# Patient Record
Sex: MALE | Race: WHITE | URBAN - METROPOLITAN AREA
[De-identification: names, ages, dates, MRNs, and addresses within clinical notes are randomized per-mention and may not be internally consistent; named-entity substitution may affect disease eponyms.]

---

## 2017-05-31 ENCOUNTER — APPOINTMENT (OUTPATIENT)
Dept: GENERAL RADIOLOGY | Facility: CLINIC | Age: 27
End: 2017-05-31
Attending: EMERGENCY MEDICINE
Payer: COMMERCIAL

## 2017-05-31 ENCOUNTER — HOSPITAL ENCOUNTER (EMERGENCY)
Facility: CLINIC | Age: 27
Discharge: HOME | End: 2017-05-31
Attending: EMERGENCY MEDICINE | Admitting: EMERGENCY MEDICINE
Payer: COMMERCIAL

## 2017-05-31 VITALS
BODY MASS INDEX: 24.52 KG/M2 | OXYGEN SATURATION: 99 % | TEMPERATURE: 97.7 F | WEIGHT: 161.8 LBS | RESPIRATION RATE: 16 BRPM | HEART RATE: 65 BPM | DIASTOLIC BLOOD PRESSURE: 70 MMHG | SYSTOLIC BLOOD PRESSURE: 120 MMHG | HEIGHT: 68 IN
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
## 2017-05-31 DIAGNOSIS — S43.004A SHOULDER DISLOCATION, RIGHT, INITIAL ENCOUNTER: ICD-10-CM

## 2017-05-31 PROCEDURE — 99283 EMERGENCY DEPT VISIT LOW MDM: CPT | Performed by: EMERGENCY MEDICINE

## 2017-05-31 PROCEDURE — 99284 EMERGENCY DEPT VISIT MOD MDM: CPT | Mod: Z6 | Performed by: EMERGENCY MEDICINE

## 2017-05-31 PROCEDURE — 73030 X-RAY EXAM OF SHOULDER: CPT | Mod: RT

## 2017-05-31 NOTE — ED AVS SNAPSHOT
[facility], [place], Emergency Department    [address] [place] [address]    Phone:  [phone]                                       Eduard Del Valle   MRN: [id number]    Department:  [facility], Emergency Department   Date of Visit:  5/31/2017           After Visit Summary Signature Page     I have received my discharge instructions, and my questions have been answered. I have discussed any challenges I see with this plan with the nurse or doctor.    ..........................................................................................................................................  Patient/Patient Representative Signature      ..........................................................................................................................................  Patient Representative Print Name and Relationship to Patient    ..................................................               ................................................  Date                                            Time    ..........................................................................................................................................  Reviewed by Signature/Title    ...................................................              ..............................................  Date                                                            Time

## 2017-05-31 NOTE — ED AVS SNAPSHOT
[facility], Emergency Department    [address] [facility] [address]    Phone:  [phone]                                       Eduard Del Valle   MRN: [id number]    Department:  [facility], Emergency Department   Date of Visit:  5/31/2017           Patient Information     Date Of Birth          1990        Your diagnoses for this visit were:     Shoulder dislocation, right, initial encounter        You were seen by Serenity Reece MD.        Discharge Instructions       Thank you for coming to the [facility] Emergency Department.     It will take ~4-6 weeks for your shoulder to heal.   Wear the sling for the next 2 weeks. Remove the sling twice daily to do gentle range of motion activities of the shoulder. You may also remove the sling to shower and sleep.     Use ibuprofen 800mg every 8 hours or tylenol 1000mg every 6 hours as needed for pain.     Please follow up in the [facility] or the Orthopedic Surgery clinic in the next 1-2 weeks. You are likely to benefit from physical therapy to help you return to normal use of the shoulder after injury.           24 Hour Appointment Hotline       To make an appointment at any [facility], call [phone] ([phone]). If you don't have a family doctor or clinic, we will help you find one. [facility] are conveniently located to serve the needs of you and your family.          ED Discharge Orders     ARM SLING L                    Review of your medicines      Notice     You have not been prescribed any medications.            Procedures and tests performed during your visit     XR Shoulder Right 2 Views      Orders Needing Specimen Collection     None      Pending Results     Date and Time Order Name Status Description    5/31/2017 2012 XR Shoulder Right 2 Views In process             Pending Culture Results     No orders found from 5/29/2017 to 6/1/2017.            Pending Results Instructions      "If you had any lab results that were not finalized at the time of your Discharge, you can call the ED Lab Result RN at [phone]. You will be contacted by this team for any positive Lab results or changes in treatment. The nurses are available 7 days a week from 10A to 6:30P.  You can leave a message 24 hours per day and they will return your call.        Thank you for choosing [facility]       Thank you for choosing [facility] for your care. Our goal is always to provide you with excellent care. Hearing back from our patients is one way we can continue to improve our services. Please take a few minutes to complete the written survey that you may receive in the mail after you visit with us. Thank you!        [employer] Information     Clinical Data lets you send messages to your doctor, view your test results, renew your prescriptions, schedule appointments and more. To sign up, go to [email or web address] . Click on \"Log in\" on the left side of the screen, which will take you to the Welcome page. Then click on \"Sign up Now\" on the right side of the page.     You will be asked to enter the access code listed below, as well as some personal information. Please follow the directions to create your username and password.     Your access code is: [email or web address]  Expires: 2017  8:55 PM     Your access code will  in 90 days. If you need help or a new code, please call your [facility] clinic or [phone].        Care EveryWhere ID     This is your Care EveryWhere ID. This could be used by other organizations to access your [facility] medical records  [id number]        After Visit Summary       This is your record. Keep this with you and show to your community pharmacist(s) and doctor(s) at your next visit.                  "

## 2017-06-01 ASSESSMENT — ENCOUNTER SYMPTOMS
CHILLS: 0
NAUSEA: 0
FEVER: 0
VOMITING: 0
DIARRHEA: 0

## 2017-06-01 NOTE — DISCHARGE INSTRUCTIONS
Thank you for coming to the [facility] Emergency Department.     It will take ~4-6 weeks for your shoulder to heal.   Wear the sling for the next 2 weeks. Remove the sling twice daily to do gentle range of motion activities of the shoulder. You may also remove the sling to shower and sleep.     Use ibuprofen 800mg every 8 hours or tylenol 1000mg every 6 hours as needed for pain.     Please follow up in the [facility] or the Orthopedic Surgery clinic in the next 1-2 weeks. You are likely to benefit from physical therapy to help you return to normal use of the shoulder after injury.

## 2017-06-01 NOTE — ED NOTES
Pt was at the gym lifting weights and felt his shoulder pop out of socket. He states this happened before, same shoulder.

## 2017-06-01 NOTE — ED PROVIDER NOTES
"  History     Chief Complaint   Patient presents with     Shoulder Injury     HPI  Eduard Del Valle is a 26 year old male who presents to the ED with a right shoulder injury. Patient states 20 minutes ago he was at the gym performing a chest press when he noticed his right shoulder pop out of its socket. He states it feels back in place now after assistance by another person at the gym. He also reports this happened before in the same shoulder. No hx of previous shoulder surgery or fractures.  He currently denies sensation changes to right arm or hand. No current pain.    PAST MEDICAL HISTORY  History reviewed. No pertinent past medical history.     PAST SURGICAL HISTORY  Past Surgical History:   Procedure Laterality Date     ORTHOPEDIC SURGERY       FAMILY HISTORY  No family history on file.     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       MEDICATIONS  No current facility-administered medications for this encounter.      No current outpatient prescriptions on file.     ALLERGIES  No Known Allergies    I have reviewed the Medications, Allergies, Past Medical and Surgical History, and Social History in the Epic system.    Review of Systems   Constitutional: Negative for chills and fever.   Gastrointestinal: Negative for diarrhea, nausea and vomiting.   All other systems reviewed and are negative.      Physical Exam   BP: 120/70  Pulse: 66  Temp: 97.7  F (36.5  C)  Resp: 16  Height: 172.7 cm (5' 8\")  Weight: 73.4 kg (161 lb 12.8 oz)  SpO2: 97 %    Physical Exam   Gen:A&Ox3, no acute distress  HEENT:PERRL, no facial tenderness or wounds, head atraumatic  Neck:no bony tenderness  Back: no CVA tenderness, no midline bony tenderness  CV:RRR without murmurs  PULM:Clear to auscultation bilaterally  Abd:soft, nontender, nondistended. Bowel " sounds present and normal  UE:No traumatic injuries, skin normal, + radial pulses and normal distal perfusion. No clavicle tenderness. No shoulder deformity. Minimal pain with right shoulder ROM. No AC joint tenderness. Sensation and motor function to the right arm and hand intact. No ecchymosis or soft tissue swelling.   LE:no traumatic injuries, skin normal      ED Course     ED Course     Procedures   8:03 PM  The patient was seen and examined by Dr. Reece in Room 23.             Critical Care time:  none  Assessments & Plan (with Medical Decision Making)   27 yo M presenting with right shoulder dislocation while lifting weights. Relocated prior to arrival.   Xray done and negative for acute fractures or dislocation. Has Hill-Sachs deformity consistent with previous dislocation.  Vitals stable and affected arm neurovascularly intact.   Placed in sling for comfort.   Recommended gentle ROM activities to prevent frozen shoulder.  Ice, rest, NSAIDs or tylenol PRN for pain.   Follow up in [facility] or Ortho clinic in 1-2 weeks.       I have reviewed the nursing notes.    I have reviewed the findings, diagnosis, plan and need for follow up with the patient.    There are no discharge medications for this patient.      Final diagnoses:   Shoulder dislocation, right, initial encounter     IJeovany, am serving as a trained medical scribe to document services personally performed by Serenity Reece MD, based on the provider's statements to me.      ISerenity MD, was physically present and have reviewed and verified the accuracy of this note documented by Jeovany Freeman.     5/31/2017   [facility], EMERGENCY DEPARTMENT    MD ELIZABETH Weinberg Katrina Anne, MD  06/01/17 0142

## 2017-06-10 ENCOUNTER — THERAPY VISIT (OUTPATIENT)
Dept: PHYSICAL THERAPY | Facility: CLINIC | Age: 27
End: 2017-06-10
Payer: COMMERCIAL

## 2017-06-10 DIAGNOSIS — M25.311 SHOULDER INSTABILITY, RIGHT: Primary | ICD-10-CM

## 2017-06-10 PROCEDURE — 97110 THERAPEUTIC EXERCISES: CPT | Mod: GP | Performed by: PHYSICAL THERAPIST

## 2017-06-10 PROCEDURE — 97161 PT EVAL LOW COMPLEX 20 MIN: CPT | Mod: GP | Performed by: PHYSICAL THERAPIST

## 2017-06-10 PROCEDURE — 97530 THERAPEUTIC ACTIVITIES: CPT | Mod: GP | Performed by: PHYSICAL THERAPIST

## 2017-06-10 NOTE — MR AVS SNAPSHOT
After Visit Summary   6/10/2017    Eduard Del Valle    MRN: [id number]           Patient Information     Date Of Birth          1990        Visit Information        Provider Department      6/10/2017 10:00 AM Faith Rizo PT [facility]        Today's Diagnoses     Shoulder instability, right    -  1       Follow-ups after your visit        Your next 10 appointments already scheduled     Jun 14, 2017 10:20 AM CDT   NURY Extremity with Faith Rizo PT   [facility] ([facility])    [address] [place] [id number] [phone]            Jun 21, 2017 12:40 PM CDT   NURY Extremity with Paresh Soria PT   [facility] ( [facility])    [address] [place] [phone]            Jun 28, 2017  7:40 AM CDT   NURY Extremity with Faith Rizo PT   [facility] [place] ( [facility])    [address] [place] [phone]              Who to contact     If you have questions or need follow up information about today's clinic visit or your schedule please contact [facility] directly at [phone].  Normal or non-critical lab and imaging results will be communicated to you by MyChart, letter or phone within 4 business days after the clinic has received the results. If you do not hear from us within 7 days, please contact the clinic through MyChart or phone. If you have a critical or abnormal lab result, we will notify you by phone as soon as possible.  Submit refill requests through [employer] or call your pharmacy and they will forward the refill request to us. Please allow 3 business days for your refill to be completed.          Additional Information About Your Visit        MyChart Information      "[employer] lets you send messages to your doctor, view your test results, renew your prescriptions, schedule appointments and more. To sign up, go to [email or web address] . Click on \"Log in\" on the left side of the screen, which will take you to the Welcome page. Then click on \"Sign up Now\" on the right side of the page.     You will be asked to enter the access code listed below, as well as some personal information. Please follow the directions to create your username and password.     Your access code is: [email or web address]  Expires: 2017  8:55 PM     Your access code will  in 90 days. If you need help or a new code, please call your [facility] clinic or [phone].        Care EveryWhere ID     This is your Care EveryWhere ID. This could be used by other organizations to access your [facility] medical records  [id number]         Blood Pressure from Last 3 Encounters:   17 120/70    Weight from Last 3 Encounters:   17 73.4 kg (161 lb 12.8 oz)              We Performed the Following     HC PT EVAL, LOW COMPLEXITY     NURY INITIAL EVAL REPORT     THERAPEUTIC ACTIVITIES     THERAPEUTIC EXERCISES        Primary Care Provider Fax #    [facility] [phone]       [address] [place] [id number]        Thank you!     Thank you for choosing [facility] [place]  for your care. Our goal is always to provide you with excellent care. Hearing back from our patients is one way we can continue to improve our services. Please take a few minutes to complete the written survey that you may receive in the mail after your visit with us. Thank you!             Your Updated Medication List - Protect others around you: Learn how to safely use, store and throw away your medicines at www.disposemymeds.org.      Notice  As of 6/10/2017 11:59 PM    You have not been prescribed any medications.      "

## 2017-06-10 NOTE — PROGRESS NOTES
[facility]  Physical Exam  General   [facility]          Physical Therapy Initial Examination/Evaluation Estee 10, 2017   Eduard Del Valle is a 26 year old male referred to physical therapy for treatment following R shoulder dislocation  Therapist Assessment:   Clinical Impression: Pt presents to [facility] [place] with primary complaint of R shoulder instability.  Pt reports that he is no longer having pain but has limited the use of his R UE.  Per clinical examination, pt with decreased ROM on the R with pain noted at end range.  Pt hesitant to use R UE.  No pain reproduced during shoulder evaluation. Pt with R shoulder instability and generalized weakness.   Pt will benefit from skilled physical therapy for strengthening and proprioceptive training.   Subjective: Pt was lifting weights when R shoulder dislocated. He had not been to gym regularly and thinks he may have been lifting too much weight. Pt demonstrating that his shoulder was in the 90-90 position.  He did wear the sling initially but is no longer wearing this regularly.    DOI/onset: 2017   Mechanism of injury: Butterfly lift with shoulder in 90-90 position    DOS NA   Related PMH: Previous dislocation of R shoulder ~10 years ago, occasional back pain  Previous treatment: none   Imagin2017 x-ray   Chief Complaint: R shoulder dislocation  Pain: rest 0 /10 Progression of symptoms since initial onset: improving Time of day when pain is worse: none in particular but pt overall limiting use of shoulder   Sleeping: no issues, doesn't sleep on R side  Social history: lives with 2 roommates;  Originally from [place] and has been in the [place] for <1 year  Occupation: researcher; 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, computer work    Current HEP/exercise regimen: took a break from exercise for some time; normally swimming, running, wt lifting  Patient's goals are return to swimming, return to regular activities  Other  pertinent PMH: low blood pressure General health as reported by patient: Excellent   Return to MD: prn      SHOULDER EVALUATION    CERVICAL SCREEN WNL    STATIC POSTURE  Forward head: present    Rounded shoulders: present    Shoulder internally rotated: bilat        SHOULDER RANGE OF MOTION  AROM Flexion Abduction ER (neutral) Flex/ER Ext/IR   Left WNL WNL WNL WNL Thumb to T3   Right 134 132 hesitant 70 WNL Thumb to T8     PROM Flexion Abd 90-90 ER   Left WNL WNL WNL   Right 156 152 80       SHOULDER STRENGTH  MMT Flexion Scaption ER IR   Left 5/5 5/5 4/5 4+/5   Right 3+/5 3+/5 3/5 3+/5     SPECIAL TESTS Left Right   Sulcus sign Present but similar on both sides   Load and Shift Negative bilat   Impingement (5) Neg bilat   AC joint Negative bilat   Apprehension - +      PALPATION  Unable to reproduce pain       Assessment/Plan:  Patient is a 26 year old male with right side shoulder complaints.    Patient has the following significant findings with corresponding treatment plan.                Diagnosis 1:  R shoulder instability  Pain -  hot/cold therapy, manual therapy, education and home program  Decreased ROM/flexibility - manual therapy, therapeutic exercise and home program  Decreased strength - therapeutic exercise, therapeutic activities and home program  Impaired muscle performance - neuro re-education and home program  Decreased function - therapeutic activities and home program    Therapy Evaluation Codes:   1) History comprised of:   Personal factors that impact the plan of care:      Age, Past/current experiences and Time since onset of symptoms.    Comorbidity factors that impact the plan of care are:      Weakness.     Medications impacting care: None.  2) Examination of Body Systems comprised of:   Body structures and functions that impact the plan of care:      Shoulder.   Activity limitations that impact the plan of care are:      Bathing, Lifting and Sports.  3) Clinical presentation  characteristics are:   Stable/Uncomplicated.  4) Decision-Making    Low complexity using standardized patient assessment instrument and/or measureable assessment of functional outcome.  Cumulative Therapy Evaluation is: Low complexity.    Previous and current functional limitations:  (See Goal Flow Sheet for this information)    Short term and Long term goals: (See Goal Flow Sheet for this information)     Communication ability:  Patient appears to be able to clearly communicate and understand verbal and written communication and follow directions correctly.  Treatment Explanation - The following has been discussed with the patient:   RX ordered/plan of care  Anticipated outcomes  Possible risks and side effects  This patient would benefit from PT intervention to resume normal activities.   Rehab potential is good.    Frequency:  1 X week, once daily  Duration:  for 6 weeks  Discharge Plan:  Achieve all LTG.  Independent in home treatment program.  Reach maximal therapeutic benefit.    Please refer to the daily flowsheet for treatment today, total treatment time and time spent performing 1:1 timed codes.

## 2017-06-10 NOTE — LETTER
[facility]  [address], [place] [address]  [phone]    2017    Re: Eduard Del Valle   :   1990  MRN:  [id number]   REFERRING PHYSICIAN:   Serenity Reece    [facility]    Date of Initial Evaluation:  6/10/17  Visits:  Rxs Used: 1  Reason for Referral:  Shoulder instability, right    EVALUATION SUMMARY    HPI  System  Physical Exam  General   ROS      Physical Therapy Initial Examination/Evaluation Estee 10, 2017   Eduard Del Valle is a 26 year old male referred to physical therapy for treatment following R shoulder dislocation  Therapist Assessment: Clinical Impression: Pt presents to [facility] with primary complaint of R shoulder instability.  Pt reports that he is no longer having pain but has limited the use of his R UE.  Per clinical examination, pt with decreased ROM on the R with pain noted at end range.  Pt hesitant to use R UE.  No pain reproduced during shoulder evaluation. Pt with R shoulder instability and generalized weakness.   Pt will benefit from skilled physical therapy for strengthening and proprioceptive training.   Subjective: Pt was lifting weights when R shoulder dislocated. He had not been to gym regularly and thinks he may have been lifting too much weight. Pt demonstrating that his shoulder was in the 90-90 position.  He did wear the sling initially but is no longer wearing this regularly.    DOI/onset: 2017   Mechanism of injury: Butterfly lift with shoulder in 90-90 position    DOS NA   Related PMH: Previous dislocation of R shoulder ~10 years ago, occasional back pain  Previous treatment: none   Imagin2017 x-ray   Chief Complaint: R shoulder dislocation  Pain: rest 0 /10 Progression of symptoms since initial onset: improving Time of day when pain is worse: none in particular but pt overall limiting use of shoulder   Sleeping: no issues,  doesn't sleep on R side  Social history: lives with 2 roommates;  Originally from [place] and has been in the [place] for <1 year  Occupation: researcher; 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, computer work    Current HEP/exercise regimen: took a break from exercise for some time; normally swimming, running, wt lifting  Patient's goals are return to swimming, return to regular activities  Other pertinent PMH: low blood pressure General health as reported by patient: Excellent   Return to MD: prn      SHOULDER EVALUATION    CERVICAL SCREEN WNL    STATIC POSTURE  Forward head: present    Rounded shoulders: present    Shoulder internally rotated: bilat      SHOULDER RANGE OF MOTION  AROM Flexion Abduction ER (neutral) Flex/ER Ext/IR   Left WNL WNL WNL WNL Thumb to T3   Right 134 132 hesitant 70 WNL Thumb to T8     PROM Flexion Abd 90-90 ER   Left WNL WNL WNL   Right 156 152 80     SHOULDER STRENGTH  MMT Flexion Scaption ER IR   Left 5/5 5/5 4/5 4+/5   Right 3+/5 3+/5 3/5 3+/5     SPECIAL TESTS Left Right   Sulcus sign Present but similar on both sides   Load and Shift Negative bilat   Impingement (5) Neg bilat   AC joint Negative bilat   Apprehension  +      PALPATION  Unable to reproduce pain     Assessment/Plan:  Patient is a 26 year old male with right side shoulder complaints.    Patient has the following significant findings with corresponding treatment plan.                Diagnosis 1:  R shoulder instability  Pain -  hot/cold therapy, manual therapy, education and home program  Decreased ROM/flexibility - manual therapy, therapeutic exercise and home program  Decreased strength - therapeutic exercise, therapeutic activities and home program  Impaired muscle performance - neuro re-education and home program  Decreased function - therapeutic activities and home program    Therapy Evaluation Codes:   1) History comprised of:   Personal factors that impact the plan of care:      Age, Past/current  experiences and Time since onset of symptoms.    Comorbidity factors that impact the plan of care are:      Weakness.     Medications impacting care: None.  2) Examination of Body Systems comprised of:   Body structures and functions that impact the plan of care:      Shoulder.   Activity limitations that impact the plan of care are:      Bathing, Lifting and Sports.  3) Clinical presentation characteristics are:   Stable/Uncomplicated.  4) Decision-Making    Low complexity using standardized patient assessment instrument and/or measureable assessment of functional outcome.  Cumulative Therapy Evaluation is: Low complexity.    Previous and current functional limitations:  (See Goal Flow Sheet for this information)    Short term and Long term goals: (See Goal Flow Sheet for this information)     Communication ability:  Patient appears to be able to clearly communicate and understand verbal and written communication and follow directions correctly.  Treatment Explanation - The following has been discussed with the patient:   RX ordered/plan of care  Anticipated outcomes  Possible risks and side effects  This patient would benefit from PT intervention to resume normal activities.   Rehab potential is good.    Frequency:  1 X week, once daily  Duration:  for 6 weeks  Discharge Plan:  Achieve all LTG.  Independent in home treatment program.  Reach maximal therapeutic benefit.    Thank you for your referral.    INQUIRIES  Therapist: Faith Rizo, PT   [facility] [address] [address]  Phone: [phone]  Fax: [phone]

## 2017-06-12 PROBLEM — M25.311 SHOULDER INSTABILITY, RIGHT: Status: ACTIVE | Noted: 2017-06-12

## 2017-06-14 ENCOUNTER — THERAPY VISIT (OUTPATIENT)
Dept: PHYSICAL THERAPY | Facility: CLINIC | Age: 27
End: 2017-06-14
Payer: COMMERCIAL

## 2017-06-14 DIAGNOSIS — M25.311 SHOULDER INSTABILITY, RIGHT: ICD-10-CM

## 2017-06-14 PROCEDURE — 97110 THERAPEUTIC EXERCISES: CPT | Mod: GP | Performed by: PHYSICAL THERAPIST

## 2017-06-21 ENCOUNTER — THERAPY VISIT (OUTPATIENT)
Dept: PHYSICAL THERAPY | Facility: CLINIC | Age: 27
End: 2017-06-21
Payer: COMMERCIAL

## 2017-06-21 DIAGNOSIS — M25.311 SHOULDER INSTABILITY, RIGHT: ICD-10-CM

## 2017-06-21 PROCEDURE — 97140 MANUAL THERAPY 1/> REGIONS: CPT | Mod: GP | Performed by: PHYSICAL THERAPIST

## 2017-06-21 PROCEDURE — 97110 THERAPEUTIC EXERCISES: CPT | Mod: GP | Performed by: PHYSICAL THERAPIST

## 2017-06-28 ENCOUNTER — THERAPY VISIT (OUTPATIENT)
Dept: PHYSICAL THERAPY | Facility: CLINIC | Age: 27
End: 2017-06-28
Payer: COMMERCIAL

## 2017-06-28 DIAGNOSIS — M25.311 SHOULDER INSTABILITY, RIGHT: ICD-10-CM

## 2017-06-28 PROCEDURE — 97112 NEUROMUSCULAR REEDUCATION: CPT | Mod: GP | Performed by: PHYSICAL THERAPIST

## 2017-06-28 PROCEDURE — 97110 THERAPEUTIC EXERCISES: CPT | Mod: GP | Performed by: PHYSICAL THERAPIST

## 2017-07-19 ENCOUNTER — THERAPY VISIT (OUTPATIENT)
Dept: PHYSICAL THERAPY | Facility: CLINIC | Age: 27
End: 2017-07-19
Payer: COMMERCIAL

## 2017-07-19 DIAGNOSIS — M25.311 SHOULDER INSTABILITY, RIGHT: ICD-10-CM

## 2017-07-19 PROCEDURE — 97110 THERAPEUTIC EXERCISES: CPT | Mod: GP | Performed by: PHYSICAL THERAPIST

## 2017-07-19 PROCEDURE — 97112 NEUROMUSCULAR REEDUCATION: CPT | Mod: GP | Performed by: PHYSICAL THERAPIST
